# Patient Record
(demographics unavailable — no encounter records)

---

## 2024-12-16 NOTE — PHYSICAL EXAM
[de-identified] : Constitutional: Well-nourished, well-developed, No acute distress Respiratory:  Good respiratory effort, no SOB Lymphatic: No regional lymphadenopathy, no lymphedema Psychiatric: Pleasant and normal affect, alert and oriented x3 Skin: Clean dry and intact B/L LE Musculoskeletal: normal except where as noted in regional exam  Left Knee: APPEARANCE: no marked deformities, no swelling or malalignment POSITIVE TENDERNESS:  + crepitus of the anterior knee, and tenderness of patellar retinaculum NONTENDER: jt lines b/l, patellar & quadriceps tendons, MCL/LCL, ITB at the lateral femoral condyle & Gerdy's tubercle, pes bursa.  ROM: full & painless, although some discomfort in deep knee flexion RESISTIVE TESTING: + discomfort with knee ext from deep knee flexion (stretched position), painless knee flexion.  SPECIAL TESTS: stable v/v stress. painless grind. neg Lachman's. neg ant/post drawer. neg Kali's.   Right Knee: APPEARANCE: no marked deformities, no swelling or malalignment POSITIVE TENDERNESS:  + crepitus of the anterior knee, and tenderness of patellar retinaculum NONTENDER: jt lines b/l, patellar & quadriceps tendons, MCL/LCL, ITB at the lateral femoral condyle & Gerdy's tubercle, pes bursa.  ROM: full & painless, although some discomfort in deep knee flexion RESISTIVE TESTING: + discomfort with knee ext from deep knee flexion (stretched position), painless knee flexion.  SPECIAL TESTS: stable v/v stress. painless grind. neg Lachman's. neg ant/post drawer. neg Kali's.   [de-identified] : The following radiographs were ordered and read by me during this patient's visit. I reviewed each radiograph in detail with the patient and discussed the findings as highlighted below.   3 views of the b/l knees were obtained today that show degenerative changes and evidence of mild PF compartment osteoarthritis.  No fracture, or dislocation seen at this time. There is no malalignment. No other obvious osseous abnormality. Otherwise unremarkable.

## 2024-12-16 NOTE — HISTORY OF PRESENT ILLNESS
[de-identified] : Pt is a 58 yo female who presents with b/l knee pain R>L  B/l knee pain: Started around 6 months, atraumatic onset. Denies any falls  Located in medially to the knee cap.  Reports some swelling at times medially Worse with kneeling, going up and down stairs and changing from a seated to standing position Takes tylenol/advil as needed for the pain  Has not done any PT for this. Has not done any sleeves  Denies any weakness, numbness, tingling coming down the legs

## 2024-12-16 NOTE — DISCUSSION/SUMMARY
[de-identified] : Discussed findings of today's exam and possible causes of patient's pain.  Educated patient on their most probable diagnosis of chronic intermittent bilateral knee pain with recent atraumatic exacerbation due to underlying patellofemoral chondromalacia and mild patellofemoral compartment osteoarthritis.  Reviewed possible courses of treatment, and we collaboratively decided best course of treatment at this time will include conservative management.  Patient has no history of any injury or trauma to the area, while she may have some degenerative meniscus pathology do not feel she has any internal derangement.  She has no surgical indications, no need for MRIs at this time.  Patient will be started on a course of physical therapy to restore normal range of motion and strength as tolerated.  Patient started on a course of oral NSAIDs, prescription given for Mobic (We discussed all possible side effects of this medication).  We discussed appropriate role and timing of both cortisone and hyaluronic injections, patient will consider HA injections as a future treatment option.  Recommend trial of over-the-counter supplements of turmeric/curcumin as natural anti-inflammatory and/or glucosamine/chondroitin as natural joint supplements.  Follow up as needed.  Patient appreciates and agrees with current plan.  This note was generated using dragon medical dictation software.  A reasonable effort has been made for proofreading its contents, but typos may still remain.  If there are any questions or points of clarification needed please notify my office.

## 2024-12-16 NOTE — HISTORY OF PRESENT ILLNESS
[de-identified] : Pt is a 58 yo female who presents with b/l knee pain R>L  B/l knee pain: Started around 6 months, atraumatic onset. Denies any falls  Located in medially to the knee cap.  Reports some swelling at times medially Worse with kneeling, going up and down stairs and changing from a seated to standing position Takes tylenol/advil as needed for the pain  Has not done any PT for this. Has not done any sleeves  Denies any weakness, numbness, tingling coming down the legs

## 2024-12-16 NOTE — DISCUSSION/SUMMARY
[de-identified] : Discussed findings of today's exam and possible causes of patient's pain.  Educated patient on their most probable diagnosis of chronic intermittent bilateral knee pain with recent atraumatic exacerbation due to underlying patellofemoral chondromalacia and mild patellofemoral compartment osteoarthritis.  Reviewed possible courses of treatment, and we collaboratively decided best course of treatment at this time will include conservative management.  Patient has no history of any injury or trauma to the area, while she may have some degenerative meniscus pathology do not feel she has any internal derangement.  She has no surgical indications, no need for MRIs at this time.  Patient will be started on a course of physical therapy to restore normal range of motion and strength as tolerated.  Patient started on a course of oral NSAIDs, prescription given for Mobic (We discussed all possible side effects of this medication).  We discussed appropriate role and timing of both cortisone and hyaluronic injections, patient will consider HA injections as a future treatment option.  Recommend trial of over-the-counter supplements of turmeric/curcumin as natural anti-inflammatory and/or glucosamine/chondroitin as natural joint supplements.  Follow up as needed.  Patient appreciates and agrees with current plan.  This note was generated using dragon medical dictation software.  A reasonable effort has been made for proofreading its contents, but typos may still remain.  If there are any questions or points of clarification needed please notify my office.

## 2024-12-16 NOTE — PHYSICAL EXAM
[de-identified] : Constitutional: Well-nourished, well-developed, No acute distress Respiratory:  Good respiratory effort, no SOB Lymphatic: No regional lymphadenopathy, no lymphedema Psychiatric: Pleasant and normal affect, alert and oriented x3 Skin: Clean dry and intact B/L LE Musculoskeletal: normal except where as noted in regional exam  Left Knee: APPEARANCE: no marked deformities, no swelling or malalignment POSITIVE TENDERNESS:  + crepitus of the anterior knee, and tenderness of patellar retinaculum NONTENDER: jt lines b/l, patellar & quadriceps tendons, MCL/LCL, ITB at the lateral femoral condyle & Gerdy's tubercle, pes bursa.  ROM: full & painless, although some discomfort in deep knee flexion RESISTIVE TESTING: + discomfort with knee ext from deep knee flexion (stretched position), painless knee flexion.  SPECIAL TESTS: stable v/v stress. painless grind. neg Lachman's. neg ant/post drawer. neg Kali's.   Right Knee: APPEARANCE: no marked deformities, no swelling or malalignment POSITIVE TENDERNESS:  + crepitus of the anterior knee, and tenderness of patellar retinaculum NONTENDER: jt lines b/l, patellar & quadriceps tendons, MCL/LCL, ITB at the lateral femoral condyle & Gerdy's tubercle, pes bursa.  ROM: full & painless, although some discomfort in deep knee flexion RESISTIVE TESTING: + discomfort with knee ext from deep knee flexion (stretched position), painless knee flexion.  SPECIAL TESTS: stable v/v stress. painless grind. neg Lachman's. neg ant/post drawer. neg Kali's.   [de-identified] : The following radiographs were ordered and read by me during this patient's visit. I reviewed each radiograph in detail with the patient and discussed the findings as highlighted below.   3 views of the b/l knees were obtained today that show degenerative changes and evidence of mild PF compartment osteoarthritis.  No fracture, or dislocation seen at this time. There is no malalignment. No other obvious osseous abnormality. Otherwise unremarkable.

## 2025-04-02 NOTE — PHYSICAL EXAM
[de-identified] : Constitutional: Well-nourished, well-developed, No acute distress Respiratory:  Good respiratory effort, no SOB Psychiatric: Pleasant and normal affect, alert and oriented x3 Skin: Clean dry and intact B/L UE Musculoskeletal: normal except where as noted in regional exam   Right Shoulder: APPEARANCE: no marked deformities, no swelling or malalignment POSITIVE TENDERNESS: supraspinatus NONTENDER:  long head biceps tendon, infraspinatus, teres minor. biceps. anterior and posterior capsule. AC joint. ROM: full with mild painful arc past 60 degrees, no scapular winging or dyskinesia present RESISTIVE TESTING: MMT 4+/5 ER, Flexion and Empty can, 5/5 IR. painless 5/5 resisted ext, horizontal abd/add SPECIAL TESTS: + Dupree and Neers, mildly + cross arm adduction, + Speeds, neg Rojas's, neg Drop Arm, neg Apprehension. neg apley's scratch test    [de-identified] : The following radiographs were ordered and read by me during this patient's visit. I reviewed each radiograph in detail with the patient and discussed the findings as highlighted below.   3 views of the right shoulder were obtained today that show no fracture, or dislocation. There are no degenerative changes seen. There is no malalignment. No obvious osseous abnormality. Otherwise unremarkable.

## 2025-04-02 NOTE — HISTORY OF PRESENT ILLNESS
[de-identified] : 60 yo RHD female who presents with right shoulder pain  Started 5-6 months ago, believes may be due to carrying a heavy bag on her right shoulder Pain primarily with internal rotation and slightly with overhead activities  Sleeping on that shoulder causes pain   Has been tried heating/ice with no relief  Has tried tylenol with minimal relief  Denies any numbness, tingling or radiation of pain  has done PT for the left shoulder in the past but not for the right shoulder

## 2025-04-02 NOTE — DISCUSSION/SUMMARY
[de-identified] : Discussed findings of today's exam and possible causes of patient's pain.  Educated patient on their most probable diagnosis of chronic intermittent right shoulder pain with recent atraumatic exacerbation due to subacromial impingement.  Reviewed possible courses of treatment, and we collaboratively decided best course of treatment at this time will include conservative management.  Patient gets GI upset with oral NSAIDs, she has already tried Tylenol as well as natural supplement of turmeric/curcumin without notable pain relief.  We discussed various treatment options as well as associated risk/benefits/alternatives and patient elected to proceed with cortisone injection today (see procedure note).  Informed the patient that the numbing medicine in today's injection will last for about 4-6 hours. The steroid that was injected will start to work in 1 to 2 days, peak at 1-2 weeks, and may last up to 1-2 months.  Patient was given a handout of home exercise program as she does not wish to proceed with formal PT at this time.  Follow up as needed.  Patient appreciates and agrees with current plan.  This note was generated using dragon medical dictation software.  A reasonable effort has been made for proofreading its contents, but typos may still remain.  If there are any questions or points of clarification needed please notify my office.

## 2025-04-02 NOTE — PROCEDURE
[de-identified] : Injection: Right  Shoulder Subacromial Space. Indication: Impingement.  A discussion was had with the patient regarding this procedure and all questions were answered. All risks, benefits and alternatives were discussed. These included but were not limited to bleeding, infection, and allergic reaction.  A timeout was done to ensure correct side and patient agreed to the procedure.  A Ramona titus was created on the skin utilizing a plastic needle cap to titus the anticipated point of entry. Alcohol was used to clean the skin, and Betadine was used to sterilize and prep the area in the posterior aspect of the shoulder. Ethyl chloride spray was then used as a topical anesthetic. A 22-gauge 1.5" needle was used to inject 2cc of 0.25% bupivacaine without epinephrine and 1cc of 40mg/ml methylprednisolone into the subacromial space. A sterile bandage was then applied. The patient tolerated the procedure well and there were no complications.